# Patient Record
Sex: FEMALE
[De-identification: names, ages, dates, MRNs, and addresses within clinical notes are randomized per-mention and may not be internally consistent; named-entity substitution may affect disease eponyms.]

---

## 2019-04-08 ENCOUNTER — RESULT REVIEW (OUTPATIENT)
Age: 34
End: 2019-04-08

## 2020-03-03 ENCOUNTER — APPOINTMENT (OUTPATIENT)
Dept: OTOLARYNGOLOGY | Facility: CLINIC | Age: 35
End: 2020-03-03
Payer: COMMERCIAL

## 2020-03-03 VITALS
HEIGHT: 60 IN | HEART RATE: 85 BPM | WEIGHT: 146 LBS | SYSTOLIC BLOOD PRESSURE: 119 MMHG | DIASTOLIC BLOOD PRESSURE: 58 MMHG | OXYGEN SATURATION: 97 % | BODY MASS INDEX: 28.66 KG/M2

## 2020-03-03 VITALS — SYSTOLIC BLOOD PRESSURE: 119 MMHG | DIASTOLIC BLOOD PRESSURE: 58 MMHG

## 2020-03-03 DIAGNOSIS — E61.1 IRON DEFICIENCY: ICD-10-CM

## 2020-03-03 DIAGNOSIS — Z78.9 OTHER SPECIFIED HEALTH STATUS: ICD-10-CM

## 2020-03-03 PROBLEM — Z00.00 ENCOUNTER FOR PREVENTIVE HEALTH EXAMINATION: Status: ACTIVE | Noted: 2020-03-03

## 2020-03-03 PROCEDURE — 99204 OFFICE O/P NEW MOD 45 MIN: CPT

## 2020-03-03 RX ORDER — ASCORBIC ACID 500 MG
TABLET ORAL
Refills: 0 | Status: ACTIVE | COMMUNITY

## 2020-03-03 RX ORDER — PYRIDOXINE HCL (VITAMIN B6) 100 MG
TABLET ORAL
Refills: 0 | Status: ACTIVE | COMMUNITY

## 2020-03-03 NOTE — HISTORY OF PRESENT ILLNESS
[de-identified] : Patient referred by Dr. Hinds  for right posterior ear mass. First noticed 12 months ago, swelling on and off, but got worse one month ago. 2/15/2020 ct neck showed 1.5cm subcutaneous nodule at the back of the right ear. per pt it was more swollen a month ago and now getting smaller now. no pain, no discharge. \par Denies pain, dysphagia, odynophagia, fever, weakness or weight loss.\par \par

## 2020-03-03 NOTE — PHYSICAL EXAM
[de-identified] : small R retroauricular nodule. [Midline] : trachea located in midline position [Normal] : no rashes

## 2020-06-30 ENCOUNTER — APPOINTMENT (OUTPATIENT)
Dept: OTOLARYNGOLOGY | Facility: CLINIC | Age: 35
End: 2020-06-30
Payer: COMMERCIAL

## 2020-06-30 VITALS
WEIGHT: 140 LBS | SYSTOLIC BLOOD PRESSURE: 112 MMHG | HEIGHT: 60 IN | DIASTOLIC BLOOD PRESSURE: 74 MMHG | TEMPERATURE: 97.6 F | BODY MASS INDEX: 27.48 KG/M2 | OXYGEN SATURATION: 99 % | HEART RATE: 60 BPM

## 2020-06-30 PROCEDURE — 99214 OFFICE O/P EST MOD 30 MIN: CPT

## 2020-06-30 NOTE — PHYSICAL EXAM
[de-identified] : small R retroauricular nodule. [Midline] : trachea located in midline position [Normal] : no rashes

## 2020-06-30 NOTE — HISTORY OF PRESENT ILLNESS
[de-identified] : Patient referred by Dr. Hinds  for right posterior ear mass. First noticed 12 months ago, swelling on and off, but got worse one month ago. 3/15/2020 ct neck showed 1.5cm subcutaneous nodule at the back of the right ear. today follow up.  no c.o, no pain no change in size. but still feels a small node there. pt is also being f./u with hematology for iron deficiency. \par Denies pain, dysphagia, odynophagia, fever, weakness or weight loss.\par Complete review of systems which was performed during a previous encounter was reviewed with the patient and there are no changes except as stated in the HPI section.\par \par \par

## 2020-06-30 NOTE — CONSULT LETTER
[Consult Letter:] : I had the pleasure of evaluating your patient, [unfilled]. [Dear  ___] : Dear  [unfilled], [Please see my note below.] : Please see my note below. [Consult Closing:] : Thank you very much for allowing me to participate in the care of this patient.  If you have any questions, please do not hesitate to contact me. [Sincerely,] : Sincerely, [FreeTextEntry2] : Dr Angelica Hinds [FreeTextEntry3] : \par Reji Zavala MD, FACS\par \par Otolaryngology-Head and Neck Surgery\par [employer] [facility]\par

## 2021-01-05 ENCOUNTER — APPOINTMENT (OUTPATIENT)
Dept: OTOLARYNGOLOGY | Facility: CLINIC | Age: 36
End: 2021-01-05
Payer: COMMERCIAL

## 2021-01-12 ENCOUNTER — APPOINTMENT (OUTPATIENT)
Dept: OTOLARYNGOLOGY | Facility: CLINIC | Age: 36
End: 2021-01-12
Payer: COMMERCIAL

## 2021-01-12 VITALS
WEIGHT: 138 LBS | HEIGHT: 60 IN | DIASTOLIC BLOOD PRESSURE: 77 MMHG | SYSTOLIC BLOOD PRESSURE: 116 MMHG | BODY MASS INDEX: 27.09 KG/M2 | HEART RATE: 68 BPM | OXYGEN SATURATION: 96 %

## 2021-01-12 VITALS — TEMPERATURE: 97.2 F

## 2021-01-12 DIAGNOSIS — Z78.9 OTHER SPECIFIED HEALTH STATUS: ICD-10-CM

## 2021-01-12 DIAGNOSIS — R59.0 LOCALIZED ENLARGED LYMPH NODES: ICD-10-CM

## 2021-01-12 PROCEDURE — 99072 ADDL SUPL MATRL&STAF TM PHE: CPT

## 2021-01-12 PROCEDURE — 99213 OFFICE O/P EST LOW 20 MIN: CPT

## 2021-01-12 NOTE — CONSULT LETTER
[Dear  ___] : Dear  [unfilled], [Courtesy Letter:] : I had the pleasure of seeing your patient, [unfilled], in my office today. [Please see my note below.] : Please see my note below. [Consult Closing:] : Thank you very much for allowing me to participate in the care of this patient.  If you have any questions, please do not hesitate to contact me. [Sincerely,] : Sincerely, [FreeTextEntry2] : Dr Angelica Hinds  [FreeTextEntry3] : \par Reji Zavala MD, FACS\par \par Otolaryngology-Head and Neck Surgery\par [employer] at [facility]\par

## 2021-01-12 NOTE — PHYSICAL EXAM
[de-identified] : small R retroauricular nodule. Stable to palpation [Midline] : trachea located in midline position [Normal] : no rashes

## 2021-01-12 NOTE — HISTORY OF PRESENT ILLNESS
[de-identified] : Pt here for 6 month f/u. Pt was referred by Dr. Hinds  for right posterior ear mass. First noticed about 1.5 years ago, swelling on and off. 3/15/2020 CT neck showed 1.5cm subcutaneous nodule at the back of the right ear.\par Today pt with no complaints. No pain in area, no change in size, no changes in hearing.\par Denies dysphagia, dyspnea, dysphonia, fever, weakness or weight loss.\par \par Complete review of systems which was performed during a previous encounter was reviewed with the patient and there are no changes except as stated in the HPI section.\par \par \par